# Patient Record
(demographics unavailable — no encounter records)

---

## 2025-03-07 NOTE — PROCEDURE
[Trigger point 1-2 muscle groups] : trigger point 1-2 muscle groups [Bilateral] : bilaterally of the [Cervical paraspinal muscle] : cervical paraspinal muscle [Trapezius muscle] : trapezius muscle [Pain] : pain [Alcohol] : alcohol [Sterile technique used] : sterile technique used [___ cc    4mg] : Dexamethasone (Decadron) ~Vcc of 4 mg  [___ cc    30mg] : Ketorolac (Toradol) ~Vcc of 30 mg  [Call if redness, pain or fever occur] : call if redness, pain or fever occur [Apply ice for 15min out of every hour for the next 12-24 hours as tolerated] : apply ice for 15 minutes out of every hour for the next 12-24 hours as tolerated [Previous OTC use and PT nontherapeutic] : patient has tried OTC's including aspirin, Ibuprofen, Aleve, etc or prescription NSAIDS, and/or exercises at home and/or physical therapy without satisfactory response [Risks, benefits, alternatives discussed / Verbal consent obtained] : the risks benefits, and alternatives have been discussed, and verbal consent was obtained

## 2025-03-08 NOTE — PHYSICAL EXAM
[de-identified] : Cervical Spine Exam: Inspection: erythema (-)   ecchymosis (-)                                                  Cervical paraspinal mm tenderness: R (-); L(-)   Upper trapezius mm tenderness:  R (+); L (+)     ROM:    Full ROM      Special Testing: Spurling Test: R (+); L (-)   Facet load test: R (-); L (-)

## 2025-03-08 NOTE — DISCUSSION/SUMMARY
[de-identified] : A discussion regarding available pain management treatment options occurred with the patient. These included interventional, rehabilitative, pharmacological, and alternative modalities. We will proceed with the following:   Interventional treatment options: 1.  Opted to perform cervical/trapezius paraspinal trigger point injection in office today. Risks with the procedure such as bleeding and infection was discussed in detail.   Rehabilitative options: - c/w chiropractic therapy.  -Participation in active HEP was discussed and printed. I gave the patient a list of home exercises to do for pain reduction and overall improvement in functional status including but not limited to active neck rotation, active neck side bend, neck flexion, neck extension, chin tuck, scalene stretch, isometric neck flexion, isometric neck extension, isometric neck side bend, head lift/neck curl, head lift/neck side bend, neck extension on hands and knees, and scapula squeeze.   Medication based treatment options: - renewed diclofenac 75 mg twice daily for duration of 4 weeks.   Complementary treatment options: - Patient was advised to stay away from any heavy lifting. If needed, he was advised to squat and not bend forward. - Initiate physician directed activity and lifestyle modifications.  Follow up in 4-6 weeks for reassessment.  I, Tim Oneal, attest that this documentation has been prepared under the direction and in the presence of Provider Juan Carlos Olea, DO The documentation recorded by the scribe, in my presence, accurately reflects the service I personally performed, and the decisions made by me with my edits as appropriate.   Best Regards, Juan Carlos Olea D.O.

## 2025-03-08 NOTE — HISTORY OF PRESENT ILLNESS
[FreeTextEntry1] : HISTORY OF PRESENT ILLNESS: Mr. Olea is a 41-year-old male complaining of neck pain. The pain started after no specific injury or event.  The patient has had this pain for about 3 weeks.  Patient describes the pain as moderate to severe.  During the last few weeks, the pain has been nearly constant with symptoms worsening and no typical pattern. Pain described a sharp, burning, tingling that radiates down the bilateral arm and is 8/10 pain intensity. Cervical flexion worsens the pain.  He has been managing his pain with gabapentin 300 mg 3 times daily, methocarbamol 750mg 3 times daily as needed as well as diclofenac 75 mg twice daily without relief.  He has also been attending sessions of chiropractic therapy with no significant relief.  Patient denies bowel/bladder incontinence, weakness, falls, tingling, numbness.  He admits to having trouble sleeping secondary to the pain. He is pending MRI approval at this time.   ACTIVITIES: Patient uses no assistive walking device at this time.  Patient has difficulty performing household chores, going to work, doing yardwork or shopping, participating in recreational activities & exercise at this time.   Prior Pain Medications: Gabapentin, diclofenac, methocarbamol.

## 2025-03-08 NOTE — PHYSICAL EXAM
[de-identified] : Cervical Spine Exam: Inspection: erythema (-)   ecchymosis (-)                                                  Cervical paraspinal mm tenderness: R (-); L(-)   Upper trapezius mm tenderness:  R (+); L (+)     ROM:    Full ROM      Special Testing: Spurling Test: R (+); L (-)   Facet load test: R (-); L (-)

## 2025-03-08 NOTE — DISCUSSION/SUMMARY
[de-identified] : A discussion regarding available pain management treatment options occurred with the patient. These included interventional, rehabilitative, pharmacological, and alternative modalities. We will proceed with the following:   Interventional treatment options: 1.  Opted to perform cervical/trapezius paraspinal trigger point injection in office today. Risks with the procedure such as bleeding and infection was discussed in detail.   Rehabilitative options: - c/w chiropractic therapy.  -Participation in active HEP was discussed and printed. I gave the patient a list of home exercises to do for pain reduction and overall improvement in functional status including but not limited to active neck rotation, active neck side bend, neck flexion, neck extension, chin tuck, scalene stretch, isometric neck flexion, isometric neck extension, isometric neck side bend, head lift/neck curl, head lift/neck side bend, neck extension on hands and knees, and scapula squeeze.   Medication based treatment options: - renewed diclofenac 75 mg twice daily for duration of 4 weeks.   Complementary treatment options: - Patient was advised to stay away from any heavy lifting. If needed, he was advised to squat and not bend forward. - Initiate physician directed activity and lifestyle modifications.  Follow up in 4-6 weeks for reassessment.  I, Tim Oneal, attest that this documentation has been prepared under the direction and in the presence of Provider Juan Carlos Olea, DO The documentation recorded by the scribe, in my presence, accurately reflects the service I personally performed, and the decisions made by me with my edits as appropriate.   Best Regards, Juan Carlos Olea D.O.

## 2025-04-14 NOTE — PHYSICAL EXAM
[de-identified] : Cervical Spine Exam: Inspection: erythema (-)   ecchymosis (-)                                                  Cervical paraspinal mm tenderness: R (-); L(-)   Upper trapezius mm tenderness:  R (+); L (+)     ROM: Extension causes neck pain. Special Testing: Spurling Test: R (+); L (-)   Facet load test: R (-); L (-)

## 2025-04-14 NOTE — DISCUSSION/SUMMARY
[de-identified] : A discussion regarding available pain management treatment options occurred with the patient. These included interventional, rehabilitative, pharmacological, and alternative modalities. We will proceed with the following:   Imaging: - MRI lumbar spine w/o contrast was ordered to evaluate for anatomic changes of the lumbar discs, nerves, and surrounding tissue that will help provide information to accurately diagnose the patient's cause of pain and therefore treat said pain generator in the most effective way possible - whether that be specific physical therapy recommendations, medications, and/or interventional therapies.  Interventional treatment options: - Potential treatment options such as further conservative therapy, injections, and surgery were briefly discussed.   Rehabilitative options: - c/w chiropractic therapy.  -Participation in active HEP was discussed and printed. I gave the patient a list of home exercises to do for pain reduction and overall improvement in functional status including but not limited to active neck rotation, active neck side bend, neck flexion, neck extension, chin tuck, scalene stretch, isometric neck flexion, isometric neck extension, isometric neck side bend, head lift/neck curl, head lift/neck side bend, neck extension on hands and knees, and scapula squeeze.   Medication based treatment options: - renewed gabapentin 300 mg nightly for duration of 4 weeks. - c/w diclofenac 75 mg twice daily for duration of 4 weeks.   Complementary treatment options: - Patient was advised to stay away from any heavy lifting. If needed, he was advised to squat and not bend forward. - Physician directed activity and lifestyle modifications.  Follow up in 4-6 weeks for reassessment.  I, Tim Oneal, attest that this documentation has been prepared under the direction and in the presence of Provider Juan Carlos Olea, DO The documentation recorded by the scribe, in my presence, accurately reflects the service I personally performed, and the decisions made by me with my edits as appropriate.   Best Regards, Juan Carlos Olea D.O.

## 2025-04-14 NOTE — HISTORY OF PRESENT ILLNESS
[FreeTextEntry1] : HISTORY OF PRESENT ILLNESS: Mr. Olea is a 41-year-old male complaining of neck pain. The pain started after no specific injury or event.  The patient has had this pain for about 3 weeks.  Patient describes the pain as moderate to severe.  During the last few weeks, the pain has been nearly constant with symptoms worsening and no typical pattern. Pain described a sharp, burning, tingling that radiates down the bilateral arm and is 8/10 pain intensity. Cervical flexion worsens the pain.  He has been managing his pain with gabapentin 300 mg 3 times daily, methocarbamol 750mg 3 times daily as needed as well as diclofenac 75 mg twice daily without relief.  He has also been attending sessions of chiropractic therapy with no significant relief.  Patient denies bowel/bladder incontinence, weakness, falls, tingling, numbness.  He admits to having trouble sleeping secondary to the pain. He is pending MRI approval at this time.   ACTIVITIES: Patient uses no assistive walking device at this time.  Patient has difficulty performing household chores, going to work, doing yardwork or shopping, participating in recreational activities & exercise at this time.   Prior Pain Medications: Gabapentin, diclofenac, methocarbamol.  PRESENTING TODAY 04-: Patient presents to the office today for a follow up visit with continued neck pain. He has been managing his pain with gabapentin and Diclofenac 75mg twice daily which provides him with 30% improvement in pain and increase in function. He denies any side effects. He notes he has been completing a home exercise regimen focusing on exercises including side plank, Quadruped arm/leg raise, Extension exercise, and Glut Bridges for at least 30 mins a day 4x a week for 6 weeks which has been providing him with minimal to no relief.  We reviewed the EMG of his upper extremities in detail which demonstrated C5-6 radiculopathy. He also has been attending sessions of chiropractic therapy for 6+ weeks with minimal to no relief.  He continues with neck pain that is sharp, burning and tingling that radiates into his bilateral arms that is a 7/10 in intensity.

## 2025-05-09 NOTE — DATA REVIEWED
[MRI] : MRI [Cervical Spine] : cervical spine [Report was reviewed and noted in the chart] : The report was reviewed and noted in the chart [I independently reviewed and interpreted images and report] : I independently reviewed and interpreted images and report [FreeTextEntry1] : pt had a c5-6 disc extrusion

## 2025-05-09 NOTE — HISTORY OF PRESENT ILLNESS
[FreeTextEntry1] :   PRESENTING TODAY 05/9/-2025: Patient continues to have neck pain that can be sharp at times that radiates down his left arm and hand but has been improving slowly. Pain is 7/10.

## 2025-05-09 NOTE — DISCUSSION/SUMMARY
[de-identified] : A discussion regarding available pain management treatment options occurred with the patient. These included interventional, rehabilitative, pharmacological, and alternative modalities. We will proceed with the following:   Imaging: mri c spine reviewed  c/w hep  c/w nsaids and gabapentin  f/u in 4 weeks

## 2025-05-09 NOTE — PHYSICAL EXAM
[de-identified] : Cervical Spine Exam: Inspection: erythema (-)   ecchymosis (-)                                                  Cervical paraspinal mm tenderness: R (-); L(-)   Upper trapezius mm tenderness:  R (+); L (+)     ROM: Extension causes neck pain. Special Testing: Spurling Test: R (+); L (-)   Facet load test: R (-); L (-)

## 2025-06-07 NOTE — PHYSICAL EXAM
[de-identified] : Cervical Spine Exam: Inspection: erythema (-)   ecchymosis (-)                                                  Cervical paraspinal mm tenderness: R (-); L(-)   Upper trapezius mm tenderness:  R (-); L (-)     ROM: Extension causes neck pain. Special Testing: Spurling Test: R (-); L (-)   Facet load test: R (-); L (-)

## 2025-06-07 NOTE — HISTORY OF PRESENT ILLNESS
[FreeTextEntry1] : PRESENTING TODAY 06/5-2025: Patient has 95% improvement of his neck pain and wants to return back to work.

## 2025-06-07 NOTE — DISCUSSION/SUMMARY
[de-identified] : A discussion regarding available pain management treatment options occurred with the patient. These included interventional, rehabilitative, pharmacological, and alternative modalities. We will proceed with the following:  filled out paperwork for [employer] that he can return to work   f/u prn